# Patient Record
(demographics unavailable — no encounter records)

---

## 2025-01-31 NOTE — PHYSICAL EXAM
[Chaperone Present] : A chaperone was present in the examining room during all aspects of the physical examination [[id number]] : A chaperone was present during the pelvic exam. [FreeTextEntry2] : Cait  [Appropriately responsive] : appropriately responsive [Alert] : alert [No Acute Distress] : no acute distress [Soft] : soft [Non-tender] : non-tender [Non-distended] : non-distended [No HSM] : No HSM [No Lesions] : no lesions [No Mass] : no mass [Oriented x3] : oriented x3 [Vulvar Atrophy] : vulvar atrophy [Labia Majora] : normal [Labia Minora] : normal [Atrophy] : atrophy [No Bleeding] : There was no active vaginal bleeding [Normal] : normal [Uterine Adnexae] : normal

## 2025-01-31 NOTE — PLAN
[FreeTextEntry1] : Urine dip is signifcant for moderate blood and small leukocytes.  Spoke to pt's daughter, who is a PA, who reports pt was worked up for hematuria which was negative.   Urine and vaginal cultures collected.  Augmentin ordered for s/s of UTI  Atrophy is a significant finding on the patient exam.  Options for symptomatic relief were reviewed.  Based on counseling the patient will begin vaginal estrogen replacement. The risks, benefits, the relationship between estrogen and breast cancer were reviewed. After reviewing estrogen options she will begin   NEW PRESCRIPTION: Estradiol   0.01mg. vaginal cream-  1 gram three times weekly Petroleum or zinc are recommended for barrier protection during the day while awaiting improvement from the estrogen.   RTO in 3 months.

## 2025-01-31 NOTE — HISTORY OF PRESENT ILLNESS
[FreeTextEntry1] : Pt is a 67-year-old who presents for vaginal burning, urinary frequency, incomplete emptying, and hesitancy for 2 weeks. Pt also reports mild vulvar/vaginal itchiness. Pt reports the symptoms are worsening over the last few days. Pt denies vaginal discharge, odor, SOB, palpitations, chills, fever, and back pain.

## 2025-01-31 NOTE — DISCUSSION/SUMMARY
[FreeTextEntry1] : I spent the time noted on the day of this patient encounter preparing for, providing and documenting the above service. I have  counseled and educated the patient on the differential, workup, disease course, and treatment/management plan. Education was provided to the patient during this encounter. All questions and concerns were answered and addressed in detail.  Soheila English NP, FNP-BC

## 2025-04-23 NOTE — HEALTH RISK ASSESSMENT
[Very Good] : ~his/her~  mood as very good [1 or 2 (0 pts)] : 1 or 2 (0 points) [Never (0 pts)] : Never (0 points) [No] : In the past 12 months have you used drugs other than those required for medical reasons? No [No falls in past year] : Patient reported no falls in the past year [0] : 2) Feeling down, depressed, or hopeless: Not at all (0) [PHQ-2 Negative - No further assessment needed] : PHQ-2 Negative - No further assessment needed [Never] : Never [NO] : No [Patient reported mammogram was normal] : Patient reported mammogram was normal [Patient reported PAP Smear was normal] : Patient reported PAP Smear was normal [Patient reported bone density results were abnormal] : Patient reported bone density results were abnormal [Patient reported colonoscopy was normal] : Patient reported colonoscopy was normal [With Family] : lives with family [Employed] : employed [College] : College [] :  [# Of Children ___] : has [unfilled] children [Sexually Active] : sexually active [Feels Safe at Home] : Feels safe at home [Independent] : managing finances [Smoke Detector] : smoke detector [Carbon Monoxide Detector] : carbon monoxide detector [Seat Belt] :  uses seat belt [Sunscreen] : uses sunscreen [With Patient/Caregiver] : , with patient/caregiver [Aggressive treatment] : aggressive treatment [None] : Patient does not have any barriers to medication adherence [Yes] : Reviewed medication list for presence of high-risk medications. [FreeTextEntry1] : none [de-identified] : No [de-identified] : NeuroSx, Neck specialist ,Rheumaol [Audit-CScore] : 0 [de-identified] : walking  [de-identified] : healthy [[id number]] : 0 [EyeExamDate] : 06/24 [Change in mental status noted] : No change in mental status noted [Language] : denies difficulty with language [Reports changes in hearing] : Reports no changes in hearing [Reports changes in vision] : Reports no changes in vision [Reports changes in dental health] : Reports no changes in dental health [MammogramDate] : 05/24 [PapSmearDate] : 11/21 [BoneDensityDate] : 04/24 [BoneDensityComments] : Osteoporosis could not tolerate PO meds SE abd pain  [ColonoscopyDate] : 01/22 [ColonoscopyComments] : , next in 5 years 2027 [HIVDate] : 01/17 [HepatitisCDate] : 01/17 [AdvancecareDate] : 04/25

## 2025-04-23 NOTE — HISTORY OF PRESENT ILLNESS
[FreeTextEntry1] : cc: wellness  [de-identified] : Patient presented  for wellness visit.  She is doing well, denies CP,SOB,Abd pain, no N,V,C,D.

## 2025-04-23 NOTE — HISTORY OF PRESENT ILLNESS
[FreeTextEntry1] : cc: wellness  [de-identified] : Patient presented  for wellness visit.  She is doing well, denies CP,SOB,Abd pain, no N,V,C,D.

## 2025-04-23 NOTE — PHYSICAL EXAM
[No Varicosities] : no varicosities [No Edema] : there was no peripheral edema [Normal Appearance] : normal in appearance [No Masses] : no palpable masses [No Nipple Discharge] : no nipple discharge [No Axillary Lymphadenopathy] : no axillary lymphadenopathy [Alert and Oriented x3] : oriented to person, place, and time [Normal] : affect was normal and insight and judgment were intact [No Acute Distress] : no acute distress [Declined Breast Exam] : declined breast exam  [de-identified] : done by GYN

## 2025-04-23 NOTE — PHYSICAL EXAM
[No Varicosities] : no varicosities [No Edema] : there was no peripheral edema [Normal Appearance] : normal in appearance [No Masses] : no palpable masses [No Nipple Discharge] : no nipple discharge [No Axillary Lymphadenopathy] : no axillary lymphadenopathy [Alert and Oriented x3] : oriented to person, place, and time [Normal] : affect was normal and insight and judgment were intact [No Acute Distress] : no acute distress [Declined Breast Exam] : declined breast exam  [de-identified] : done by GYN

## 2025-04-23 NOTE — HEALTH RISK ASSESSMENT
[Very Good] : ~his/her~  mood as very good [1 or 2 (0 pts)] : 1 or 2 (0 points) [Never (0 pts)] : Never (0 points) [No] : In the past 12 months have you used drugs other than those required for medical reasons? No [No falls in past year] : Patient reported no falls in the past year [0] : 2) Feeling down, depressed, or hopeless: Not at all (0) [PHQ-2 Negative - No further assessment needed] : PHQ-2 Negative - No further assessment needed [Never] : Never [NO] : No [Patient reported mammogram was normal] : Patient reported mammogram was normal [Patient reported PAP Smear was normal] : Patient reported PAP Smear was normal [Patient reported bone density results were abnormal] : Patient reported bone density results were abnormal [Patient reported colonoscopy was normal] : Patient reported colonoscopy was normal [With Family] : lives with family [Employed] : employed [College] : College [] :  [# Of Children ___] : has [unfilled] children [Sexually Active] : sexually active [Feels Safe at Home] : Feels safe at home [Independent] : managing finances [Smoke Detector] : smoke detector [Carbon Monoxide Detector] : carbon monoxide detector [Seat Belt] :  uses seat belt [Sunscreen] : uses sunscreen [With Patient/Caregiver] : , with patient/caregiver [Aggressive treatment] : aggressive treatment [None] : Patient does not have any barriers to medication adherence [Yes] : Reviewed medication list for presence of high-risk medications. [FreeTextEntry1] : none [de-identified] : No [de-identified] : NeuroSx, Neck specialist ,Rheumaol [Audit-CScore] : 0 [de-identified] : walking  [de-identified] : healthy [[id number]] : 0 [EyeExamDate] : 06/24 [Change in mental status noted] : No change in mental status noted [Language] : denies difficulty with language [Reports changes in hearing] : Reports no changes in hearing [Reports changes in vision] : Reports no changes in vision [Reports changes in dental health] : Reports no changes in dental health [MammogramDate] : 05/24 [PapSmearDate] : 11/21 [BoneDensityDate] : 04/24 [BoneDensityComments] : Osteoporosis could not tolerate PO meds SE abd pain  [ColonoscopyDate] : 01/22 [ColonoscopyComments] : , next in 5 years 2027 [HIVDate] : 01/17 [HepatitisCDate] : 01/17 [AdvancecareDate] : 04/25

## 2025-04-23 NOTE — DATA REVIEWED
[FreeTextEntry1] : last blood work d/w the pt at length  EKG - SB at 58 6 bpm, + nonspecific T wave changes

## 2025-05-12 NOTE — PHYSICAL EXAM
[Appropriately responsive] : appropriately responsive [Alert] : alert [No Acute Distress] : no acute distress [Soft] : soft [Non-tender] : non-tender [Non-distended] : non-distended [No HSM] : No HSM [No Lesions] : no lesions [No Mass] : no mass [Oriented x3] : oriented x3 [Labia Majora] : normal [Labia Minora] : normal [Normal] : normal [Uterine Adnexae] : normal [Atrophy] : atrophy [Chaperone Declined] : Chaperone offered however refused by patient,

## 2025-05-12 NOTE — PLAN
[FreeTextEntry1] : Pt to continue estradiol cream three times a week for vaginal atrophy.   Mammo/sono pending for 2025

## 2025-05-31 NOTE — HISTORY OF PRESENT ILLNESS
[FreeTextEntry1] : Skin check Dr Branch. Complains scratched spot left upper arm, 1 week. No treatment.  [de-identified] : No personal or family history of cutaneous malignancy.  Raised in [place]. Beach volleyball since childhood. 2yo granddaughter

## 2025-05-31 NOTE — ASSESSMENT
[FreeTextEntry1] : Alert, oriented, well pleasant.   Sun-exposed cutaneous exam. No evidence of cutaneous malignancy.   Brown macules and papules less than 0.6cm generalized especially trunk.  Nevi. No treatment.  Erythematous papules especially trunk. Angioma. No treatment.  Actinic damage. Reviewed sun protection.  Left upper arm 2mm dry crust. No visible or palpable lesion. Neoplasm uncertain behavior. Excoriation f/u if recurs, grows, spreads.  Monitor, report and follow up for changes or symptomatic skin lesions.